# Patient Record
Sex: MALE | Race: WHITE | NOT HISPANIC OR LATINO | Employment: FULL TIME | URBAN - NONMETROPOLITAN AREA
[De-identification: names, ages, dates, MRNs, and addresses within clinical notes are randomized per-mention and may not be internally consistent; named-entity substitution may affect disease eponyms.]

---

## 2023-11-17 PROBLEM — I48.91 ATRIAL FIBRILLATION (MULTI): Status: ACTIVE | Noted: 2023-11-17

## 2023-11-17 PROBLEM — E78.5 HYPERLIPIDEMIA: Status: ACTIVE | Noted: 2023-11-17

## 2023-11-17 RX ORDER — ATORVASTATIN CALCIUM 20 MG/1
1 TABLET, FILM COATED ORAL DAILY
COMMUNITY
Start: 2022-03-02 | End: 2024-02-26

## 2023-11-17 RX ORDER — ASPIRIN 81 MG/1
1 TABLET ORAL DAILY
COMMUNITY

## 2023-11-17 RX ORDER — SOTALOL HYDROCHLORIDE 80 MG/1
1 TABLET ORAL EVERY 12 HOURS
COMMUNITY
Start: 2022-03-02 | End: 2023-11-20 | Stop reason: SDUPTHER

## 2023-11-20 ENCOUNTER — OFFICE VISIT (OUTPATIENT)
Dept: CARDIOLOGY | Facility: CLINIC | Age: 53
End: 2023-11-20
Payer: MEDICARE

## 2023-11-20 VITALS
WEIGHT: 218 LBS | DIASTOLIC BLOOD PRESSURE: 72 MMHG | BODY MASS INDEX: 28.89 KG/M2 | SYSTOLIC BLOOD PRESSURE: 124 MMHG | HEART RATE: 89 BPM | HEIGHT: 73 IN

## 2023-11-20 DIAGNOSIS — E78.2 MIXED HYPERLIPIDEMIA: ICD-10-CM

## 2023-11-20 DIAGNOSIS — I48.0 PAROXYSMAL ATRIAL FIBRILLATION (MULTI): Primary | ICD-10-CM

## 2023-11-20 PROBLEM — Z79.899 HIGH RISK MEDICATION USE: Status: ACTIVE | Noted: 2023-11-20

## 2023-11-20 PROCEDURE — 99214 OFFICE O/P EST MOD 30 MIN: CPT | Performed by: INTERNAL MEDICINE

## 2023-11-20 PROCEDURE — 93000 ELECTROCARDIOGRAM COMPLETE: CPT | Performed by: INTERNAL MEDICINE

## 2023-11-20 PROCEDURE — 1036F TOBACCO NON-USER: CPT | Performed by: INTERNAL MEDICINE

## 2023-11-20 RX ORDER — SOTALOL HYDROCHLORIDE 120 MG/1
120 TABLET ORAL EVERY 12 HOURS
Qty: 180 TABLET | Refills: 3 | Status: SHIPPED | OUTPATIENT
Start: 2023-11-20

## 2023-11-20 ASSESSMENT — ENCOUNTER SYMPTOMS
DIZZINESS: 1
PALPITATIONS: 1
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
## 2023-11-29 ENCOUNTER — ANCILLARY PROCEDURE (OUTPATIENT)
Dept: CARDIOLOGY | Facility: CLINIC | Age: 53
End: 2023-11-29
Payer: COMMERCIAL

## 2023-11-29 VITALS
WEIGHT: 222 LBS | HEIGHT: 73 IN | DIASTOLIC BLOOD PRESSURE: 72 MMHG | SYSTOLIC BLOOD PRESSURE: 116 MMHG | HEART RATE: 57 BPM | BODY MASS INDEX: 29.42 KG/M2

## 2023-11-29 DIAGNOSIS — I48.0 PAROXYSMAL ATRIAL FIBRILLATION (MULTI): ICD-10-CM

## 2023-11-29 PROCEDURE — 93000 ELECTROCARDIOGRAM COMPLETE: CPT | Performed by: INTERNAL MEDICINE

## 2023-11-29 NOTE — PROGRESS NOTES
"Patient here for at EKG visit ordered by Dr. España due to a-fib. Sherri Trinh NP in suite physician. Patient here due to increased Sotalol to 120mg twice daily . Medication list Updated verbally. No cardiac complaints. Discussed with YOGI Shay RN prior to discharge.     To Dr. España for review.        Vitals:    11/29/23 0828   BP: 116/72   BP Location: Left arm   Patient Position: Sitting   Pulse: 57   Weight: 101 kg (222 lb)   Height: 1.854 m (6' 1\")        "

## 2023-12-04 NOTE — PROGRESS NOTES
Spoke with patient. He would like to move forward with [facility]. Order placed into chart to be scheduled.

## 2024-01-17 ENCOUNTER — APPOINTMENT (OUTPATIENT)
Dept: CARDIOLOGY | Facility: CLINIC | Age: 54
End: 2024-01-17
Payer: COMMERCIAL

## 2024-02-25 DIAGNOSIS — E78.5 HYPERLIPIDEMIA, UNSPECIFIED: ICD-10-CM

## 2024-02-25 DIAGNOSIS — I48.91 UNSPECIFIED ATRIAL FIBRILLATION (MULTI): ICD-10-CM

## 2024-02-26 RX ORDER — SOTALOL HYDROCHLORIDE 80 MG/1
80 TABLET ORAL EVERY 12 HOURS
Qty: 180 TABLET | Refills: 1 | Status: SHIPPED | OUTPATIENT
Start: 2024-02-26 | End: 2025-02-25

## 2024-02-26 RX ORDER — ATORVASTATIN CALCIUM 20 MG/1
20 TABLET, FILM COATED ORAL NIGHTLY
Qty: 90 TABLET | Refills: 3 | Status: SHIPPED | OUTPATIENT
Start: 2024-02-26 | End: 2025-02-25

## 2024-06-18 ENCOUNTER — APPOINTMENT (OUTPATIENT)
Dept: CARDIOLOGY | Facility: CLINIC | Age: 54
End: 2024-06-18
Payer: COMMERCIAL

## 2024-06-18 VITALS
BODY MASS INDEX: 29.29 KG/M2 | SYSTOLIC BLOOD PRESSURE: 120 MMHG | HEART RATE: 84 BPM | DIASTOLIC BLOOD PRESSURE: 78 MMHG | WEIGHT: 221 LBS | HEIGHT: 73 IN

## 2024-06-18 DIAGNOSIS — I48.0 PAROXYSMAL ATRIAL FIBRILLATION (MULTI): ICD-10-CM

## 2024-06-18 PROCEDURE — 99214 OFFICE O/P EST MOD 30 MIN: CPT | Performed by: INTERNAL MEDICINE

## 2024-06-18 PROCEDURE — 3008F BODY MASS INDEX DOCD: CPT | Performed by: INTERNAL MEDICINE

## 2024-06-18 PROCEDURE — 1036F TOBACCO NON-USER: CPT | Performed by: INTERNAL MEDICINE

## 2024-06-18 PROCEDURE — 93000 ELECTROCARDIOGRAM COMPLETE: CPT | Performed by: INTERNAL MEDICINE

## 2024-06-18 RX ORDER — SOTALOL HYDROCHLORIDE 160 MG/1
160 TABLET ORAL EVERY 12 HOURS
Qty: 180 TABLET | Refills: 3 | Status: SHIPPED | OUTPATIENT
Start: 2024-06-18 | End: 2025-06-18

## 2024-06-18 NOTE — PROGRESS NOTES
"Subjective   Sanju Alvarez is a 53 y.o. male       Chief Complaint    Follow-up          HPI     Patient returns in follow-up of problems as noted.  In the interim he has done well but he is aware of palpitation and he believes he is in atrial fibrillation.  In fact it turns out he is.  We discussed his treatment and he prefers restoration of maintenance of sinus rhythm because he feels that his functional status is improved.  Because of this we will increase his sotalol dosage as noted and have him come back next week with an EKG.  Ultimately we may go as high as 180 mg twice a day.  Our goal is to restore and maintain rhythm and he agrees to the plan.  He was educated regarding cardiac signs and symptoms watch for and encouraged to call if they arise or occur.      Vitals:    06/18/24 1040   BP: 120/78   BP Location: Right arm   Patient Position: Sitting   Pulse: 84   Weight: 100 kg (221 lb)   Height: 1.854 m (6' 1\")    EKG done in office today      Objective   Physical Exam  Constitutional:       Appearance: Normal appearance.   HENT:      Nose: Nose normal.   Neck:      Vascular: No carotid bruit.   Cardiovascular:      Rate and Rhythm: Normal rate.      Pulses: Normal pulses.      Heart sounds: Normal heart sounds.   Pulmonary:      Effort: Pulmonary effort is normal.   Abdominal:      General: Bowel sounds are normal.      Palpations: Abdomen is soft.   Musculoskeletal:         General: Normal range of motion.      Cervical back: Normal range of motion.      Right lower leg: No edema.      Left lower leg: No edema.   Skin:     General: Skin is warm and dry.   Neurological:      General: No focal deficit present.      Mental Status: He is alert.   Psychiatric:         Mood and Affect: Mood normal.         Behavior: Behavior normal.         Thought Content: Thought content normal.         Judgment: Judgment normal.         Allergies  Patient has no known allergies.     Current Medications    Current " Outpatient Medications:     aspirin 81 mg EC tablet, Take 1 tablet (81 mg) by mouth once daily., Disp: , Rfl:     atorvastatin (Lipitor) 20 mg tablet, Take 1 tablet (20 mg) by mouth once daily at bedtime., Disp: 90 tablet, Rfl: 3               Assessment/Plan   1. Paroxysmal atrial fibrillation (Multi)  Symptomatic.  Intensify therapy recommended as above with follow-up EKG.  - Follow Up In Cardiology    2. BMI 29.0-29.9,adult  The merits of diet and weight loss were advocated      Scribe Attestation  By signing my name below, I, Ashley JADE LPN   , Scribe   attest that this documentation has been prepared under the direction and in the presence of Abelino España MD.     Provider Attestation - Scribe documentation    All medical record entries made by the Scribe were at my direction and personally dictated by me. I have reviewed the chart and agree that the record accurately reflects my personal performance of the history, physical exam, discussion and plan.

## 2024-06-27 ENCOUNTER — APPOINTMENT (OUTPATIENT)
Dept: CARDIOLOGY | Facility: CLINIC | Age: 54
End: 2024-06-27
Payer: COMMERCIAL

## 2024-06-27 VITALS
DIASTOLIC BLOOD PRESSURE: 86 MMHG | HEIGHT: 73 IN | HEART RATE: 54 BPM | SYSTOLIC BLOOD PRESSURE: 122 MMHG | WEIGHT: 220 LBS | BODY MASS INDEX: 29.16 KG/M2

## 2024-06-27 DIAGNOSIS — I48.0 PAROXYSMAL ATRIAL FIBRILLATION (MULTI): ICD-10-CM

## 2024-06-27 PROCEDURE — 93000 ELECTROCARDIOGRAM COMPLETE: CPT | Performed by: INTERNAL MEDICINE

## 2024-06-27 NOTE — PROGRESS NOTES
"Patient here for at EKG visit ordered by Dr. España due to AFIB. Dr. Allison in suite. Patient here due to Sotalol dose increase . Medication list Updated verbally.No cardiac complaints. Discussed with NURIA selby RN prior to discharge.     To Dr. España for review    Patient reports couple times a week breakthrough, brief only last short amount of time.         Vitals:    06/27/24 1113   BP: 122/86   BP Location: Right arm   Patient Position: Sitting   Pulse: 54   Weight: 99.8 kg (220 lb)   Height: 1.854 m (6' 1\")       EKG done in office today   "

## 2024-08-25 DIAGNOSIS — I48.0 PAROXYSMAL ATRIAL FIBRILLATION (MULTI): ICD-10-CM

## 2024-08-25 DIAGNOSIS — I48.91 UNSPECIFIED ATRIAL FIBRILLATION (MULTI): ICD-10-CM

## 2024-08-26 RX ORDER — SOTALOL HYDROCHLORIDE 160 MG/1
160 TABLET ORAL EVERY 12 HOURS
Qty: 180 TABLET | Refills: 3 | Status: SHIPPED | OUTPATIENT
Start: 2024-08-26 | End: 2025-08-26

## 2024-09-06 ENCOUNTER — TELEPHONE (OUTPATIENT)
Dept: CARDIOLOGY | Facility: CLINIC | Age: 54
End: 2024-09-06
Payer: COMMERCIAL

## 2024-09-06 NOTE — TELEPHONE ENCOUNTER
"Patient presented to office stating he does not want to take lipitor, spoke with sec. Per patient medication can cause \"side effects\" and does not want to cont and has not been taking it. Patient states he would like to get his cholesterol checked and go from there. To Dr. Izabel Allison MD   "

## 2025-01-15 ENCOUNTER — APPOINTMENT (OUTPATIENT)
Dept: CARDIOLOGY | Facility: CLINIC | Age: 55
End: 2025-01-15
Payer: COMMERCIAL

## 2025-01-15 VITALS
WEIGHT: 249 LBS | HEART RATE: 64 BPM | HEIGHT: 73 IN | BODY MASS INDEX: 33 KG/M2 | DIASTOLIC BLOOD PRESSURE: 74 MMHG | SYSTOLIC BLOOD PRESSURE: 122 MMHG

## 2025-01-15 DIAGNOSIS — Z87.891 FORMER SMOKER: ICD-10-CM

## 2025-01-15 DIAGNOSIS — I48.0 PAROXYSMAL ATRIAL FIBRILLATION (MULTI): Primary | ICD-10-CM

## 2025-01-15 DIAGNOSIS — Z79.899 HIGH RISK MEDICATION USE: ICD-10-CM

## 2025-01-15 DIAGNOSIS — E66.811 OBESITY, CLASS I, BMI 30-34.9: ICD-10-CM

## 2025-01-15 DIAGNOSIS — N52.9 ERECTILE DYSFUNCTION, UNSPECIFIED ERECTILE DYSFUNCTION TYPE: ICD-10-CM

## 2025-01-15 PROCEDURE — 1036F TOBACCO NON-USER: CPT | Performed by: INTERNAL MEDICINE

## 2025-01-15 PROCEDURE — 99214 OFFICE O/P EST MOD 30 MIN: CPT | Performed by: INTERNAL MEDICINE

## 2025-01-15 PROCEDURE — 93000 ELECTROCARDIOGRAM COMPLETE: CPT | Performed by: INTERNAL MEDICINE

## 2025-01-15 PROCEDURE — 3008F BODY MASS INDEX DOCD: CPT | Performed by: INTERNAL MEDICINE

## 2025-01-15 ASSESSMENT — ENCOUNTER SYMPTOMS
DYSPNEA ON EXERTION: 1
PALPITATIONS: 1
DIZZINESS: 1

## 2025-01-15 NOTE — PROGRESS NOTES
Subjective   Sanju Alvarez is a 54 y.o. male       Chief Complaint    Follow-up          HPI   54-year-old white male who has previously followed with Dr. España for paroxysmal atrial fibrillation.  He does not qualify for anticoagulation and therefore has been only on sotalol and he takes aspirin.  He continued to have breakthrough atrial fibrillation almost on a weekly basis.  It has been well-tolerated.  He does not seem to have any organic heart disease.  He had remote history of a treadmill stress test in 2014 which was normal.  He is worried about underlying CAD and he believes that his erectile dysfunction may be related to this problem.  He is also noticing hair loss on his legs and he read somewhere that could be related to cardiovascular disease.  To put this issue to rest advised patient to have coronary calcium score and a treadmill stress test which he agreed to.  He had an echocardiogram in 2021 which was normal.  EKG today revealed normal sinus rhythm with incomplete right bundle branch block.  Patient is non-smoker nondrinker and maintains active lifestyle.    Assessment/recommendations:    1-paroxysmal atrial fibrillation with no organic heart disease.  Continue to have frequent breakthrough events while he is taking sotalol 160 mg twice daily.  The patient was advised to consider radiofrequency ablation.  He will think about it.  Meanwhile he does not qualify for anticoagulation due to [id number]-HSDi score of 0.  He does not need aspirin.  2-patient has concern for underlying CAD, he has not had any stress test over 10 years and has not had any coronary calcium score.  He was advised to have a coronary calcium score and a treadmill stress test and restratify his risk based on that.  He agrees.  3-high risk medication with sotalol, basic metabolic profile is ordered to check on his electrolyte and renal function.  4-erectile dysfunction, he was advised that he can utilize medication such as  "phosphodiesterase inhibitors, he does not have a family physician and I am not going to prescribe these medications  5-class I obesity, the patient was encouraged to adopt healthy lifestyle, maintain active daily functions and lose weight with low-calorie diet  Review of Systems   Constitutional: Positive for malaise/fatigue.   Cardiovascular:  Positive for chest pain, dyspnea on exertion and palpitations.   Neurological:  Positive for dizziness.   All other systems reviewed and are negative.           Vitals:    01/15/25 1106   BP: 122/74   BP Location: Right arm   Patient Position: Sitting   Pulse: 64   Weight: 113 kg (249 lb)   Height: 1.854 m (6' 1\")        EKG done in office today    Objective   Physical Exam  Constitutional:       Appearance: Normal appearance.   HENT:      Nose: Nose normal.   Neck:      Vascular: No carotid bruit.   Cardiovascular:      Rate and Rhythm: Normal rate.      Pulses: Normal pulses.      Heart sounds: Normal heart sounds.   Pulmonary:      Effort: Pulmonary effort is normal.   Abdominal:      General: Bowel sounds are normal.      Palpations: Abdomen is soft.   Musculoskeletal:         General: Normal range of motion.      Cervical back: Normal range of motion.      Right lower leg: No edema.      Left lower leg: No edema.   Skin:     General: Skin is warm and dry.   Neurological:      General: No focal deficit present.      Mental Status: He is alert.   Psychiatric:         Mood and Affect: Mood normal.         Behavior: Behavior normal.         Thought Content: Thought content normal.         Judgment: Judgment normal.         Allergies  Patient has no known allergies.     Current Medications    Current Outpatient Medications:     sotalol (Betapace) 160 mg tablet, Take 1 tablet (160 mg) by mouth every 12 hours., Disp: 180 tablet, Rfl: 3                     Assessment/Plan   1. Paroxysmal atrial fibrillation (Multi)  Follow Up In Cardiology    Follow Up In Cardiology    ECG 12 " Lead    CT cardiac scoring wo IV contrast    Stress Test    Basic Metabolic Panel    Basic Metabolic Panel      2. High risk medication use        3. Former smoker        4. BMI 32.0-32.9,adult        5. Obesity, Class I, BMI 30-34.9        6. Erectile dysfunction, unspecified erectile dysfunction type                 Scribe Attestation  By signing my name below, Sofi MOODY LPN, Scribe   attest that this documentation has been prepared under the direction and in the presence of Izabel Allison MD.     Provider Attestation - Scribe documentation    All medical record entries made by the Scribe were at my direction and personally dictated by me. I have reviewed the chart and agree that the record accurately reflects my personal performance of the history, physical exam, discussion and plan.

## 2025-01-15 NOTE — PATIENT INSTRUCTIONS
BMI was above normal measurement. Current weight: 113 kg (249 lb)  Weight change since last visit (-) denotes wt loss 29 lbs   Weight loss needed to achieve BMI 25: 59.9 Lbs  Weight loss needed to achieve BMI 30: 22.1 Lbs  Provided instructions on dietary changes  Provided instructions on exercise.    Stop aspirin  Coronary Calcium Score  Stress GXT  Ablation discussed.  Chem 6 lab work  Follow up

## 2025-01-30 ENCOUNTER — HOSPITAL ENCOUNTER (OUTPATIENT)
Dept: CARDIOLOGY | Facility: CLINIC | Age: 55
Discharge: HOME | End: 2025-01-30
Payer: COMMERCIAL

## 2025-01-30 VITALS — SYSTOLIC BLOOD PRESSURE: 114 MMHG | HEART RATE: 58 BPM | DIASTOLIC BLOOD PRESSURE: 78 MMHG

## 2025-01-30 DIAGNOSIS — I48.0 PAROXYSMAL ATRIAL FIBRILLATION (MULTI): ICD-10-CM

## 2025-01-30 PROCEDURE — 93018 CV STRESS TEST I&R ONLY: CPT | Performed by: INTERNAL MEDICINE

## 2025-01-30 PROCEDURE — 93016 CV STRESS TEST SUPVJ ONLY: CPT | Performed by: INTERNAL MEDICINE

## 2025-01-30 PROCEDURE — 93017 CV STRESS TEST TRACING ONLY: CPT

## 2025-02-03 ENCOUNTER — TELEPHONE (OUTPATIENT)
Dept: CARDIOLOGY | Facility: CLINIC | Age: 55
End: 2025-02-03
Payer: COMMERCIAL

## 2025-02-03 NOTE — TELEPHONE ENCOUNTER
Result Communication    Resulted Orders   Stress Test    Narrative                   [address], [address], [place], [place] [address]          Tel [phone] Fax [phone]    Exercise Stress Test    Patient Name:      MADDI XAVIER  Ordering Provider:     [id number] MARGOT ALLISON  Study Date:        1/30/2025           Reading Physician:     [id number] Michi Haile MD  MRN/PID:           [id number]            Supervising Physician: [id number] Margot Allison MD, [facility]  Accession#:        [id number]        Fellow:  Date of Birth/Age: 1970 / 54     Fellow:                     years  Gender:            M                   Nurse:                 Sean Guzman RN  Admission Status:                      Sonographer:           NA  Height:            185.42 cm           Technologist:  Weight:            112.95 kg           Additional Staff:  BSA:               2.36 m2             Encounter#:            [id number]  BMI:               32.85 kg/m2         Patient Location:    Study Type:    STRESS TEST ONLY  Diagnosis/ICD: Paroxysmal atrial fibrillation-I48.0  Indication:    Atrial Fibrillation  CPT Codes:     Stress Test Interpretation-24691; Stress Test Supervision-79621    Falls Risk: Low: Patient has low risk for sustaining a fall; environmental safety interventions in place.     Study Details: Correct procedure and correct patient verified verbally.       Patient History:  Allergies: None.       Patient Performance: The patient exercised to stage III on a Lee protocol for 7 minutes and 05 seconds, achieving 8.60 METS. The peak heart rate achieved was 130 bpm, which was 79 % of the age predicted target heart rate of 164 bpm. The resting blood pressure was 114/78 mmHg with a heart rate of 58 bpm. The  standing blood pressure was 116/78 mmHg with a heart rate of 57 bpm. The patient's functional capacity was below average. The patient developed leg fatigue and dyspnea during the stress exam. The symptoms resolved with rest. The blood pressure response was normal. The test was terminated due to: dyspnea and leg fatigue and musculoskeletal weakness. Sinus tachycardia with nondiagnostic ST-T changes. Significant artifact were seen.     Double Product (HR x BP): 218.       Baseline ECG: Resting ECG showed normal sinus rhythm with normal tracing. Normal sinus rhythm with subtle ST-T changes.     Stress Stage Data:  +-----------------+---+------+-------+                   HR Sys BPDias BP  +-----------------+---+------+-------+  Baseline Resting 58 114   78       +-----------------+---+------+-------+  Baseline Dmrngqko22 116   78       +-----------------+---+------+-------+  Stage I          88 142   76       +-----------------+---+------+-------+  Stage II         [id number]   78       +-----------------+---+------+-------+  Stage III        [id number]   86       +-----------------+---+------+-------+       Recovery ECG: The heart rate recovery was normal.     +------------+---+------+-------+              HR Sys BPDias BP  +------------+---+------+-------+  Recovery I  [id number]   84       +------------+---+------+-------+  Recovery II [id number]   78       +------------+---+------+-------+  Recovery III83 146   82       +------------+---+------+-------+  Recovery IV 78 132   78       +------------+---+------+-------+       Summary:   1. Submaximal graded exercise stress test with nondiagnostic ST-T changes considering subtle baseline ST-T abnormality.   2. No provoked chest pain or arrhythmia.   3. Patient was able to exercise for 7 minutes achieving only 79% of maximum predicted heart rate making this test nondiagnostic.   4. Appropriate blood pressure response to exercise.    5. Normal heart rate recovery phase.   6. Poor quality tracing throughout.   7. Fair exercise tolerance.    [id number] Michi Haile MD  Electronically signed on 1/30/2025 at 4:54:04 PM                      ** Final **         11:09 AM      Results were successfully communicated with the patient and they acknowledged their understanding.

## 2025-02-03 NOTE — TELEPHONE ENCOUNTER
----- Message from Izabel Allison sent at 1/31/2025  5:38 PM EST -----  Let him know his stress test came back normal  ----- Message -----  From: Daniele, Syngo - Cardiology Results In  Sent: 1/30/2025   4:54 PM EST  To: Izabel Allison MD

## 2025-03-14 DIAGNOSIS — I48.91 UNSPECIFIED ATRIAL FIBRILLATION (MULTI): ICD-10-CM

## 2025-03-14 RX ORDER — SOTALOL HYDROCHLORIDE 160 MG/1
160 TABLET ORAL EVERY 12 HOURS
Qty: 180 TABLET | Refills: 3 | Status: SHIPPED | OUTPATIENT
Start: 2025-03-14 | End: 2026-03-14

## 2025-03-21 ENCOUNTER — HOSPITAL ENCOUNTER (OUTPATIENT)
Dept: RADIOLOGY | Facility: CLINIC | Age: 55
Discharge: HOME | End: 2025-03-21
Payer: COMMERCIAL

## 2025-03-21 DIAGNOSIS — I48.0 PAROXYSMAL ATRIAL FIBRILLATION (MULTI): ICD-10-CM

## 2025-03-21 PROCEDURE — 75571 CT HRT W/O DYE W/CA TEST: CPT

## 2025-03-24 ENCOUNTER — TELEPHONE (OUTPATIENT)
Dept: CARDIOLOGY | Facility: CLINIC | Age: 55
End: 2025-03-24
Payer: COMMERCIAL

## 2025-03-24 DIAGNOSIS — E78.5 HYPERLIPIDEMIA, UNSPECIFIED HYPERLIPIDEMIA TYPE: ICD-10-CM

## 2025-03-24 DIAGNOSIS — I48.91 ATRIAL FIBRILLATION, UNSPECIFIED TYPE (MULTI): ICD-10-CM

## 2025-03-24 RX ORDER — ASPIRIN 81 MG/1
81 TABLET ORAL DAILY
Start: 2025-03-24 | End: 2026-03-24

## 2025-03-24 NOTE — TELEPHONE ENCOUNTER
Advised pt of results and recommendations, he verbalized understanding.    Orders prepped and sent to Dr. Izabel Allison MD for signature     Once labs signed fax to [facility]

## 2025-03-24 NOTE — TELEPHONE ENCOUNTER
----- Message from Izbael Allison sent at 3/23/2025  1:10 PM EDT -----  Let him know his coronary calcium score came at 242 which is moderate increase indicating underlying plaques in his coronary arteries.  I would like to check his lipid profile and have him start taking baby aspirin and I will let him know when the stress test results are when they become available  ----- Message -----  From: Interface, Radiology Results In  Sent: 3/22/2025   3:28 PM EDT  To: Izabel Allison MD

## 2025-03-24 NOTE — TELEPHONE ENCOUNTER
Result Communication    Resulted Orders   CT cardiac scoring wo IV contrast    Addendum: 3/23/2025    Interpreted By:  Alan Winters,   ADDENDUM:  NON-CARDIOVASCULAR FINDINGS      INCLUDED LUNGS, AIRWAYS AND PLEURA  Endotracheal / endobronchial lesion: Negative  Nodule: Negative  Airspace disease: Negative  Pleural effusion: Negative  Pneumothorax: Negative  Other: No acute or contributory unanticipated findings      INCLUDED NON-CARDIOVASCULAR KARO AND MEDIASTINUM  Adenopathy: Negative  Included esophagus: Unremarkable      Other: No acute or contributory unanticipated findings      INCLUDED BONES: No acute skeletal findings, noting less sensitivity  and specificity without dedicated sagittal and coronal reformatted  series.      INCLUDED CHEST WALL  No acute or contributory unanticipated findings      INCLUDED UPPER ABDOMEN  No acute or contributory unanticipated findings      -------      NON-CARDIOVASCULAR IMPRESSION      NO ACUTE OR CONTRIBUTORY UNEXPECTED FINDINGS OF THE INCLUDED  NON-CARDIOVASCULAR STRUCTURES      NOTE THIS ADDENDUM IS SOLELY FOR INTERPRETATION OF ANATOMY OUTSIDE  THE CARDIOVASCULAR SYSTEM. INTERPRETATION OF AND REPORTING OF THE  CARDIOVASCULAR STRUCTURES ARE THE SOLE RESPONSIBILITY OF THE  CARDIOLOGIST SUBMITTING THE ORIGINAL REPORT (NOT THIS ADDENDUM)      Signed by: Alan Winters 3/23/2025 8:57 AM      -------- ORIGINAL REPORT --------  Dictation workstation:   [id number]      Narrative    Interpreted By:  Rogelio Solis,   STUDY:  CT CARDIAC SCORING WO IV CONTRAST; 3/21/2025 4:02 pm      INDICATION:  Signs/Symptoms:hx of PAF  Screening..      COMPARISON:  None.      ACCESSION NUMBER(S):  [id number]      ORDERING CLINICIAN:  MARGOT ALLEN      TECHNIQUE:  Using prospective ECG gating, CT scan of the coronary arteries was  performed without intravenous contrast. Coronary calcium scoring  was  performed according to the method of Agatston.      CT Dose-Length Product (DLP): 80 mGy*cm  CT  Dose Reduction Employed: Yes, prospective gating, iterative  reconstruction.      FINDINGS:  The score and distribution of calcium in the coronary arteries is as  follows:        LAD 92  LCx 0  RCA 10      Total 242      The visualized ascending thoracic aorta measures 3.0 cm in diameter.  The heart is normal in size. No pericardial effusion is present.          The main pulmonary artery, right and left pulmonary artery are normal  in size.            Impression    1. Coronary artery calcium score of 242*.  2. ROMERO 88th percentile** for age, gender, and race in asymptomatic  patients.          *Coronary Artery  Agatston score      Score risk  Very low 1-99  Mildly increased 100-299  Moderately increased >300  Moderate to severely increased >800      Feliciano et al. JCCT 2016 (http://dx.doi.org/10.1016/j.jcct.2016.11.003)      ** ROMERO Percentile  In general, greater than 75th percentile for age, gender, and race is  considered to be a higher relative risk and higher lifetime risk  condition. Greater than 75th percentile=moderate to severely  increased relative risk irrespective of the score. Advise using ROMERO  10 year CHD risk calculator below for better discrimination of risk.      ROMERO 10-Year CHD Risk with Coronary Artery Calcification can be  calcuate using link below  [email or web address]-nhlbi.org/[email or web address].[email or web address]  Ivette prater al. JACC 2015 (http://dx.doi.org/10.1016/j.j  acc.2015.08.035)      Reading Cardiologist: Dr. Rogelio Solis, Date: 3/22/2025 3:27 pm      Signed by: Rogelio Solis 3/22/2025 3:27 PM  Dictation workstation:   [id number]

## 2025-03-28 LAB
NON-UH HIE CHOL/HDL RATIO: 3.6
NON-UH HIE CHOLESTEROL: 174 MG/DL (ref 140–200)
NON-UH HIE HDL CHOLESTEROL: 49 MG/DL (ref 23–92)
NON-UH HIE LDL CHOLESTEROL,CALCULATED: 111 MG/DL (ref 0–100)
NON-UH HIE TRIGLYCERIDE W/REFLEX: 71 MG/DL (ref 0–149)
NON-UH HIE VLDL CHOLESTEROL: 14 MG/DL

## 2025-03-31 ENCOUNTER — TELEPHONE (OUTPATIENT)
Dept: CARDIOLOGY | Facility: CLINIC | Age: 55
End: 2025-03-31
Payer: COMMERCIAL

## 2025-03-31 NOTE — TELEPHONE ENCOUNTER
----- Message from Izabel Allison sent at 3/31/2025 10:36 AM EDT -----  His stress test was nondiagnostic since he did not achieve the target heart rate, that will not change what we do in the absence of symptoms, as I suggested earlier he need to be on aspirin and statin as I have recommended previously, his erectile dysfunction is unlikely to be related to cardiovascular disease  ----- Message -----  From: Brittaney Jeffries RN  Sent: 3/31/2025   9:16 AM EDT  To: Izabel Allison MD    PT had coronary calcium score 3/21/25. Lipid panel was ordered as a result of calcium score. Any further recommendations for pt at this time?  ----- Message -----  From: Izabel Allison MD  Sent: 3/28/2025   5:03 PM EDT  To: Do T[facility] Clinical Support Staff    If he was not scheduled to have coronary calcium score please schedule  ----- Message -----  From: Dale Sanabria - Lab Results In  Sent: 3/28/2025   3:15 PM EDT  To: Izabel Allison MD

## 2025-03-31 NOTE — TELEPHONE ENCOUNTER
Result Communication    Resulted Orders   [facility] Lipid Panel   Result Value Ref Range    [facility] Cholesterol 174 140 - 200 mg/dL      Comment:         Chol less than 200 mg/dl      low risk   Chol 201-239 mg/dl            borderline risk   Chol 240 mg/dl and greater    high risk    [facility] HDL Cholesterol 49 23 - 92 mg/dL      Comment:         HDL CHOL ATP-III CLASSIFICATION                              Cardiovascular                                  Risk      HDL > or equal to 60 mg/dL     LOW   HDL < 40 mg/dL                 HIGH    [facility] Triglyceride w/Reflex 71 0 - 149 mg/dL      Comment:         TRIG ATP III CLASSIFICATION   TRIG less than 150 mg/dL      Normal   TRIG 150-199 mg/dL            Borderline high   TRIG 200-500 mg/dL            High   TRIG greater than 500 mg/dL   Very high   Standard traceable to the Center for Disease Conrtrol and   Prevention (CDC) test method.    [facility] LDL Cholesterol,Calculated 111 (H) 0 - 100 mg/dL      Comment:         LDL ATP III CLASSIFICATION   LDL less than 100 mg/dL       Optimal   -129 mg/dL             Near or above optimal   -159 mg/dL             Borderline high   -189 mg/dL             High   LDL greater than 189 mg/dL    Very high    [facility] VLDL CHOLESTEROL 14 mg/dL    [facility] Chol/HDL Ratio 3.6 <5.0      Comment:      PERFORMED BY:[place] [id number] [place] [place], [place] [id number] MEDICAL DIRECTORBG DOUGLAS M.D.       3:44 PM      Detailed VM left to return call.

## 2025-04-02 NOTE — TELEPHONE ENCOUNTER
Patient does take aspirin , but not daily. Patient will take aspirin daily. Patient states he will research the chol medication and call back with decision. Expressed concerned about effects on brain/memory.   Will wait for return call

## 2025-09-16 ENCOUNTER — APPOINTMENT (OUTPATIENT)
Dept: CARDIOLOGY | Facility: CLINIC | Age: 55
End: 2025-09-16
Payer: COMMERCIAL